# Patient Record
Sex: FEMALE | Race: WHITE | NOT HISPANIC OR LATINO
[De-identification: names, ages, dates, MRNs, and addresses within clinical notes are randomized per-mention and may not be internally consistent; named-entity substitution may affect disease eponyms.]

---

## 2018-12-20 PROBLEM — Z00.00 ENCOUNTER FOR PREVENTIVE HEALTH EXAMINATION: Status: ACTIVE | Noted: 2018-12-20

## 2019-01-18 ENCOUNTER — APPOINTMENT (OUTPATIENT)
Dept: ENDOCRINOLOGY | Facility: CLINIC | Age: 24
End: 2019-01-18
Payer: COMMERCIAL

## 2019-01-18 VITALS — DIASTOLIC BLOOD PRESSURE: 70 MMHG | HEART RATE: 94 BPM | SYSTOLIC BLOOD PRESSURE: 128 MMHG | HEIGHT: 61 IN

## 2019-01-18 DIAGNOSIS — Z78.9 OTHER SPECIFIED HEALTH STATUS: ICD-10-CM

## 2019-01-18 DIAGNOSIS — Z87.09 PERSONAL HISTORY OF OTHER DISEASES OF THE RESPIRATORY SYSTEM: ICD-10-CM

## 2019-01-18 PROCEDURE — 99204 OFFICE O/P NEW MOD 45 MIN: CPT

## 2019-01-18 NOTE — REVIEW OF SYSTEMS
[Fatigue] : fatigue [Recent Weight Gain (___ Lbs)] : recent [unfilled] ~Ulb weight gain [Neck Pain] : neck pain [Nausea] : nausea [Muscle Cramps] : muscle cramps [Dry Skin] : dry skin [Tremors] : tremors [Depression] : depression [Cold Intolerance] : cold intolerant [Easy Bruising] : a tendency for easy bruising [Blurry Vision] : no blurred vision [Palpitations] : no palpitations [Shortness Of Breath] : no shortness of breath [Constipation] : no constipation [Muscle Weakness] : no muscle weakness [Hair Loss] : no hair loss

## 2019-01-18 NOTE — ASSESSMENT
[FreeTextEntry1] : 23 year old female with multinodular goiter, Hashimoto's Thyroiditis with associated hypothyroidism, obesity and hyperlipidemia.   \par \par 1.  Hashimoto's-  check labs now and if hypothyroidism is confirmed, will resume therapy with Synthroid.\par 2.  Multinodular goiter-  check thyroid US now.  Will obtain prior results.  \par 3.  Hyperlipidemia-  check fasting lipids\par 4.  Obesity-  check A1c.

## 2019-01-18 NOTE — PHYSICAL EXAM
[No Acute Distress] : no acute distress [Normal Sclera/Conjunctiva] : normal sclera/conjunctiva [No Proptosis] : no proptosis [No Neck Mass] : no neck mass was observed [No LAD] : no lymphadenopathy [No Respiratory Distress] : no respiratory distress [Clear to Auscultation] : lungs were clear to auscultation bilaterally [Normal Rate] : heart rate was normal  [Normal S1, S2] : normal S1 and S2 [Regular Rhythm] : with a regular rhythm [Murmurs] : no murmurs [No Edema] : there was no peripheral edema [Normal Gait] : normal gait [No Clubbing, Cyanosis] : no clubbing  or cyanosis of the fingernails [No Rash] : no rash [Acne] : no acne [Normal Insight/Judgement] : insight and judgment were intact [Normal Affect] : the affect was normal [Normal Mood] : the mood was normal [de-identified] : obese female [de-identified] : thyroid is full and rubbery, no palpable nodules [de-identified] : slight resting tremor, biceps DTRs 2 + b/l.

## 2019-01-18 NOTE — HISTORY OF PRESENT ILLNESS
[FreeTextEntry1] : About two years ago, during a routine physical examination, a slight goiter was palpated.  She was subsequently sent for a thyroid ultrasound revealing small sized nodules and was referred to an endocrinologist.  Further evaluation showed Hashimoto's Thyroiditis with associated hypothyroidism and she was started on a low dose of Synthroid with some improvement in her fatigue.  \par \par She is now seeking new endocrine care.  She has not had any blood work or ultrasound done in some time and has not been taking her Synthroid in many months.  She was also told she had hyperlipidemia in the past.  \par

## 2019-01-24 ENCOUNTER — APPOINTMENT (OUTPATIENT)
Dept: ENDOCRINOLOGY | Facility: CLINIC | Age: 24
End: 2019-01-24
Payer: COMMERCIAL

## 2019-01-24 PROCEDURE — 76536 US EXAM OF HEAD AND NECK: CPT

## 2019-01-29 NOTE — IMPRESSION
[FreeTextEntry1] : Multiple small bilateral colloid cysts measuring 3 mm in size or smaller.  These are likely not significantly changed when compared to previous report.   [FreeTextEntry2] : Due to small size and benign appearance, close follow up is not necessary.

## 2019-01-29 NOTE — PROCEDURE
[Other: ___] : [unfilled]. All measurements will be reported as longitudinal x bety-posterior x transverse. [Report dated ___] : Report dated [unfilled] [Multinodular Goiter] : multinodular goiter [FreeTextEntry1] : 4.6 x 1.1 x 1.6 [FreeTextEntry5] : 4.2 x 1 x 1.4 [FreeTextEntry2] : 0.2 [de-identified] : Slightly heterogeneous gland.  There are multiple colloid cysts visualized.  The largest on the right is in the lower pole measuring 0.3cm and the largest on the left is in the lower pole and measures 0.3cm.

## 2019-05-30 ENCOUNTER — APPOINTMENT (OUTPATIENT)
Dept: ENDOCRINOLOGY | Facility: CLINIC | Age: 24
End: 2019-05-30

## 2019-11-18 LAB
HBA1C MFR BLD HPLC: 6.4
LDLC SERPL DIRECT ASSAY-MCNC: 110
MICROALBUMIN/CREAT 24H UR-RTO: 3

## 2019-11-19 ENCOUNTER — LABORATORY RESULT (OUTPATIENT)
Age: 24
End: 2019-11-19

## 2019-11-19 ENCOUNTER — APPOINTMENT (OUTPATIENT)
Dept: ENDOCRINOLOGY | Facility: CLINIC | Age: 24
End: 2019-11-19
Payer: COMMERCIAL

## 2019-11-19 VITALS
DIASTOLIC BLOOD PRESSURE: 68 MMHG | HEIGHT: 61 IN | WEIGHT: 196 LBS | BODY MASS INDEX: 37 KG/M2 | HEART RATE: 83 BPM | SYSTOLIC BLOOD PRESSURE: 130 MMHG

## 2019-11-19 DIAGNOSIS — E78.5 HYPERLIPIDEMIA, UNSPECIFIED: ICD-10-CM

## 2019-11-19 DIAGNOSIS — E04.2 NONTOXIC MULTINODULAR GOITER: ICD-10-CM

## 2019-11-19 DIAGNOSIS — E55.9 VITAMIN D DEFICIENCY, UNSPECIFIED: ICD-10-CM

## 2019-11-19 DIAGNOSIS — E06.3 AUTOIMMUNE THYROIDITIS: ICD-10-CM

## 2019-11-19 PROCEDURE — 99214 OFFICE O/P EST MOD 30 MIN: CPT

## 2019-11-19 RX ORDER — ERGOCALCIFEROL 1.25 MG/1
1.25 MG CAPSULE, LIQUID FILLED ORAL
Qty: 13 | Refills: 0 | Status: DISCONTINUED | COMMUNITY
Start: 2019-01-30 | End: 2019-11-19

## 2019-11-19 RX ORDER — NORGESTIMATE AND ETHINYL ESTRADIOL 7DAYSX3 28
0.18/0.215/0.25 KIT ORAL
Refills: 0 | Status: DISCONTINUED | COMMUNITY
End: 2019-11-19

## 2019-11-19 RX ORDER — SULFAMETHOXAZOLE AND TRIMETHOPRIM 800; 160 MG/1; MG/1
800-160 TABLET ORAL DAILY
Qty: 30 | Refills: 0 | Status: DISCONTINUED | COMMUNITY
End: 2019-11-19

## 2019-11-19 NOTE — PHYSICAL EXAM
[No Acute Distress] : no acute distress [Alert] : alert [Normal Sclera/Conjunctiva] : normal sclera/conjunctiva [Well Developed] : well developed [Well Nourished] : well nourished [Supple] : the neck was supple [EOMI] : extra ocular movement intact [No Thyroid Nodules] : there were no palpable thyroid nodules [No LAD] : no lymphadenopathy [Normal Rate and Effort] : normal respiratory rhythm and effort [Clear to Auscultation] : lungs were clear to auscultation bilaterally [Normal Rate] : heart rate was normal  [No Accessory Muscle Use] : no accessory muscle use [Normal S1, S2] : normal S1 and S2 [Regular Rhythm] : with a regular rhythm [No Edema] : there was no peripheral edema [Normal Gait] : normal gait [No Rash] : no rash [No Tremors] : no tremors [No Motor Deficits] : the motor exam was normal [Normal Insight/Judgement] : insight and judgment were intact [Oriented x3] : oriented to person, place, and time [Normal Mood] : the mood was normal [Acanthosis Nigricans] : no acanthosis nigricans [de-identified] : thyroid full and rubbery

## 2019-11-19 NOTE — ASSESSMENT
[FreeTextEntry1] : 25 y/o female with Hashimoto's, HLD, MNG, and Vitamin D Deficiency.\par \par Plan: \par Hashimoto's': check TFTs now\par - may need to resume Synthroid - await for lab results\par \par MNG: stable bilateral colloid cysts- repeat thyroid sonogram in 1 year\par \par HLD: check lipids now\par - follow a low fat diet\par - increase routine exercise\par \par Vitamin D Deficiency: check labs\par \par Follow up with ophthalmology r/t right eye with impaired vision \par \par Labs and follow up in 6-8 weeks.

## 2019-11-19 NOTE — REASON FOR VISIT
[Follow-Up: _____] : a [unfilled] follow-up visit [Parent] : parent [Other: _____] : [unfilled] [FreeTextEntry1] : Hashimoto's, HLD, MNG, and Vitamin D Deficiency

## 2019-11-19 NOTE — REVIEW OF SYSTEMS
[Fatigue] : fatigue [Recent Weight Gain (___ Lbs)] : recent [unfilled] ~Ulb weight gain [Visual Field Defect] : visual field defect [Dysphagia] : dysphagia [Neck Pain] : neck pain [Hair Loss] : hair loss [Dry Skin] : dry skin [Headache] : headaches [Anxiety] : anxiety [Cold Intolerance] : cold intolerant [Easy Bruising] : a tendency for easy bruising [Decreased Appetite] : appetite not decreased [Blurry Vision] : no blurred vision [Dysphonia] : no dysphonia [Chest Pain] : no chest pain [Palpitations] : no palpitations [Tremors] : no tremors [Depression] : no depression [Heat Intolerance] : heat tolerant [Swelling] : no swelling [FreeTextEntry3] : right eye vision  [de-identified] : sinus headaches

## 2019-11-19 NOTE — HISTORY OF PRESENT ILLNESS
[FreeTextEntry1] : Pt. reports on October 5th, 2019 she developed neck swelling/pain, she seen ENT which told her that her current condition was not related to ENT. Then she followed up with urgent care and at the time they obtained labs along with ordering a thyroid sonogram. Pt. is unaware of lab results along with sonogram results. Pt. reports right with impaired vision. \par \par Quality: Hx of Hashimoto's\par Severity: moderate \par Duration: 2017\par Onset: fatigue, palpable thyroid gland\par Modifying Factors:\par Associated Symptoms: neck pain started on October 5, 2019, c/o dysphagia, no dysphonia  \par \par Notes:\par In the past was on Synthroid unsure of dose and discontinue medication over 1 year due to normal lab results \par \par Current Regimen: none\par \par \par Labs reviewed: No recent labs.\par \par Date of last thyroid sonogram: 10/31/19 - Impression: Colloid cysts right and left lobe. There is a stable appearance to since sub-5 mm colloid cyst within the left and right lobe. There is no interval development of any solid nodules within the gland. Prominent lymph nodes on bilateral neck \par

## 2019-11-20 ENCOUNTER — RESULT REVIEW (OUTPATIENT)
Age: 24
End: 2019-11-20

## 2019-11-20 LAB
25(OH)D3 SERPL-MCNC: 24.2 NG/ML
ALBUMIN SERPL ELPH-MCNC: 4.6 G/DL
ALP BLD-CCNC: 123 U/L
ALT SERPL-CCNC: 13 U/L
ANION GAP SERPL CALC-SCNC: 13 MMOL/L
AST SERPL-CCNC: 17 U/L
BILIRUB SERPL-MCNC: 0.2 MG/DL
BUN SERPL-MCNC: 12 MG/DL
CALCIUM SERPL-MCNC: 9.5 MG/DL
CHLORIDE SERPL-SCNC: 105 MMOL/L
CHOLEST SERPL-MCNC: 204 MG/DL
CHOLEST/HDLC SERPL: 4 RATIO
CO2 SERPL-SCNC: 23 MMOL/L
CREAT SERPL-MCNC: 0.82 MG/DL
GLUCOSE SERPL-MCNC: 93 MG/DL
HDLC SERPL-MCNC: 51 MG/DL
LDLC SERPL CALC-MCNC: 132 MG/DL
POTASSIUM SERPL-SCNC: 4.4 MMOL/L
PROT SERPL-MCNC: 7.3 G/DL
SODIUM SERPL-SCNC: 141 MMOL/L
T3 SERPL-MCNC: 148 NG/DL
T3FREE SERPL-MCNC: 3.45 PG/ML
T3RU NFR SERPL: 1.1 TBI
T4 FREE SERPL-MCNC: 1.1 NG/DL
T4 SERPL-MCNC: 6.9 UG/DL
TRIGL SERPL-MCNC: 105 MG/DL
TSH SERPL-ACNC: 1.69 UIU/ML

## 2021-03-30 ENCOUNTER — RESULT REVIEW (OUTPATIENT)
Age: 26
End: 2021-03-30

## 2024-12-23 ENCOUNTER — APPOINTMENT (OUTPATIENT)
Dept: ORTHOPEDIC SURGERY | Facility: CLINIC | Age: 29
End: 2024-12-23
Payer: COMMERCIAL

## 2024-12-23 VITALS — HEIGHT: 60 IN | BODY MASS INDEX: 34.95 KG/M2 | TEMPERATURE: 98.1 F | WEIGHT: 178 LBS

## 2024-12-23 DIAGNOSIS — M79.642 PAIN IN LEFT HAND: ICD-10-CM

## 2024-12-23 DIAGNOSIS — M79.641 PAIN IN RIGHT HAND: ICD-10-CM

## 2024-12-23 DIAGNOSIS — M79.642 PAIN IN RIGHT HAND: ICD-10-CM

## 2024-12-23 DIAGNOSIS — M25.532 PAIN IN LEFT WRIST: ICD-10-CM

## 2024-12-23 PROCEDURE — 99204 OFFICE O/P NEW MOD 45 MIN: CPT | Mod: 25

## 2024-12-23 PROCEDURE — 73110 X-RAY EXAM OF WRIST: CPT | Mod: LT,RT
